# Patient Record
Sex: MALE | Race: WHITE | Employment: FULL TIME | URBAN - METROPOLITAN AREA
[De-identification: names, ages, dates, MRNs, and addresses within clinical notes are randomized per-mention and may not be internally consistent; named-entity substitution may affect disease eponyms.]

---

## 2017-03-28 ENCOUNTER — OFFICE VISIT (OUTPATIENT)
Dept: FAMILY MEDICINE CLINIC | Age: 32
End: 2017-03-28
Payer: COMMERCIAL

## 2017-03-28 VITALS
SYSTOLIC BLOOD PRESSURE: 122 MMHG | HEART RATE: 72 BPM | WEIGHT: 315 LBS | DIASTOLIC BLOOD PRESSURE: 80 MMHG | BODY MASS INDEX: 50.71 KG/M2

## 2017-03-28 DIAGNOSIS — R05.8 POST-VIRAL COUGH SYNDROME: Primary | ICD-10-CM

## 2017-03-28 PROCEDURE — 99213 OFFICE O/P EST LOW 20 MIN: CPT

## 2017-03-28 RX ORDER — PREDNISONE 50 MG/1
50 TABLET ORAL DAILY
Qty: 5 TABLET | Refills: 0 | Status: SHIPPED | OUTPATIENT
Start: 2017-03-28 | End: 2017-04-14

## 2017-03-28 RX ORDER — .ALPHA.-TOCOPHEROL ACETATE, DL-, CHOLECALCIFEROL, CYANOCOBALAMIN, FERROUS FUMARATE, FOLIC ACID, NIACINAMIDE, SODIUM ASCORBATE, ASCORBIC ACID, PYRIDOXINE HYDROCHLORIDE, RIBOFLAVIN, AND THIAMINE MONONITRATE 11; 400; 12; 29; 1; 20; 60; 60; 10; 3; 2 [IU]/1; [IU]/1; UG/1; MG/1; MG/1; MG/1; MG/1; MG/1; MG/1; MG/1; MG/1
TABLET, CHEWABLE ORAL
Refills: 5 | COMMUNITY
Start: 2017-03-11

## 2017-03-28 ASSESSMENT — ENCOUNTER SYMPTOMS
WHEEZING: 0
COUGH: 1
SHORTNESS OF BREATH: 0
SORE THROAT: 0
RHINORRHEA: 0

## 2017-04-14 RX ORDER — AMOXICILLIN 500 MG/1
500 CAPSULE ORAL 3 TIMES DAILY
Qty: 30 CAPSULE | Refills: 0 | Status: SHIPPED | OUTPATIENT
Start: 2017-04-14 | End: 2017-04-24

## 2018-08-10 ENCOUNTER — OFFICE VISIT (OUTPATIENT)
Dept: FAMILY MEDICINE CLINIC | Age: 33
End: 2018-08-10
Payer: COMMERCIAL

## 2018-08-10 VITALS
DIASTOLIC BLOOD PRESSURE: 70 MMHG | HEART RATE: 71 BPM | TEMPERATURE: 98.8 F | SYSTOLIC BLOOD PRESSURE: 122 MMHG | WEIGHT: 195 LBS | BODY MASS INDEX: 30.54 KG/M2

## 2018-08-10 DIAGNOSIS — J40 BRONCHITIS: Primary | ICD-10-CM

## 2018-08-10 DIAGNOSIS — M75.82 ROTATOR CUFF TENDONITIS, LEFT: ICD-10-CM

## 2018-08-10 PROCEDURE — 99213 OFFICE O/P EST LOW 20 MIN: CPT | Performed by: FAMILY MEDICINE

## 2018-08-10 RX ORDER — AMOXICILLIN 875 MG/1
875 TABLET, COATED ORAL 2 TIMES DAILY
Qty: 20 TABLET | Refills: 0 | Status: SHIPPED | OUTPATIENT
Start: 2018-08-10 | End: 2018-08-20

## 2018-08-10 ASSESSMENT — ENCOUNTER SYMPTOMS
SINUS PAIN: 1
TROUBLE SWALLOWING: 0
RHINORRHEA: 1
ABDOMINAL PAIN: 0
WHEEZING: 0
SINUS PRESSURE: 1
CONSTIPATION: 0
DIARRHEA: 0
SHORTNESS OF BREATH: 0
SORE THROAT: 1
COUGH: 1
BLOOD IN STOOL: 0
BACK PAIN: 0

## 2018-08-10 ASSESSMENT — PATIENT HEALTH QUESTIONNAIRE - PHQ9
2. FEELING DOWN, DEPRESSED OR HOPELESS: 0
SUM OF ALL RESPONSES TO PHQ QUESTIONS 1-9: 0
1. LITTLE INTEREST OR PLEASURE IN DOING THINGS: 0
SUM OF ALL RESPONSES TO PHQ9 QUESTIONS 1 & 2: 0
SUM OF ALL RESPONSES TO PHQ QUESTIONS 1-9: 0

## 2018-08-10 NOTE — PROGRESS NOTES
Byron Dela Cruz is a 29 y.o. male who presents today for his medical conditions/complaints as noted below. Byron Dela Cruz is c/o of Pharyngitis; Congestion; and Shoulder Pain (left)  Sick for four days all in family have had Strep throat. He also has had a four month hx of left shoulder pain and limited ROM. When he rolls onto his left side it awakens him in the middle of the night. HPI:     Visit Information    Have you changed or started any medications since your last visit including any over-the-counter medicines, vitamins, or herbal medicines? no   Are you having any side effects from any of your medications? -  no  Have you stopped taking any of your medications? Is so, why? -  no    Have you seen any other physician or provider since your last visit? No  Have you had any other diagnostic tests since your last visit? No  Have you been seen in the emergency room and/or had an admission to a hospital since we last saw you? No  Have you had your routine dental cleaning in the past 6 months? no    Have you activated your [employer] account? If not, what are your barriers? No:      Patient Care Team:  Elise Horowitz DO as PCP - Willard Ga MD (Dermatology)    Medical History Review  Past Medical, Family, and Social History reviewed and  contribute to the patient presenting condition    Health Maintenance   Topic Date Due    HIV screen  02/28/2001    Flu vaccine (1) 09/01/2018    DTaP/Tdap/Td vaccine (6 - Td) 11/07/2023       No past medical history on file. No past surgical history on file. No family history on file.   Social History   Substance Use Topics    Smoking status: Never Smoker    Smokeless tobacco: Never Used    Alcohol use Yes      Comment: rarely      Current Outpatient Prescriptions   Medication Sig Dispense Refill    amoxicillin (AMOXIL) 875 MG tablet Take 1 tablet by mouth 2 times daily for 10 days 20 tablet 0    PRENATA 29-1 MG CHEW CHEW AND SWALLOW 1

## 2018-08-21 ENCOUNTER — TELEPHONE (OUTPATIENT)
Dept: FAMILY MEDICINE CLINIC | Age: 33
End: 2018-08-21

## 2018-08-22 DIAGNOSIS — M25.512 LEFT SHOULDER PAIN, UNSPECIFIED CHRONICITY: Primary | ICD-10-CM

## 2019-06-06 ENCOUNTER — OFFICE VISIT (OUTPATIENT)
Dept: FAMILY MEDICINE CLINIC | Age: 34
End: 2019-06-06
Payer: COMMERCIAL

## 2019-06-06 VITALS
WEIGHT: 193.6 LBS | SYSTOLIC BLOOD PRESSURE: 120 MMHG | HEART RATE: 76 BPM | HEIGHT: 67 IN | OXYGEN SATURATION: 98 % | DIASTOLIC BLOOD PRESSURE: 70 MMHG | BODY MASS INDEX: 30.39 KG/M2

## 2019-06-06 DIAGNOSIS — Z98.84 HISTORY OF WEIGHT LOSS SURGERY: Primary | ICD-10-CM

## 2019-06-06 DIAGNOSIS — E66.09 OBESITY DUE TO EXCESS CALORIES WITHOUT SERIOUS COMORBIDITY, UNSPECIFIED CLASSIFICATION: ICD-10-CM

## 2019-06-06 PROCEDURE — 99213 OFFICE O/P EST LOW 20 MIN: CPT | Performed by: FAMILY MEDICINE

## 2019-06-06 RX ORDER — PHENTERMINE HYDROCHLORIDE 37.5 MG/1
37.5 CAPSULE ORAL EVERY MORNING
Qty: 30 CAPSULE | Refills: 0 | Status: SHIPPED | OUTPATIENT
Start: 2019-06-06 | End: 2019-07-06

## 2019-06-06 RX ORDER — IBUPROFEN 200 MG
400-600 TABLET ORAL
COMMUNITY

## 2019-06-06 ASSESSMENT — PATIENT HEALTH QUESTIONNAIRE - PHQ9
SUM OF ALL RESPONSES TO PHQ QUESTIONS 1-9: 0
1. LITTLE INTEREST OR PLEASURE IN DOING THINGS: 0
2. FEELING DOWN, DEPRESSED OR HOPELESS: 0
SUM OF ALL RESPONSES TO PHQ9 QUESTIONS 1 & 2: 0
SUM OF ALL RESPONSES TO PHQ QUESTIONS 1-9: 0

## 2019-06-06 ASSESSMENT — ENCOUNTER SYMPTOMS
DIARRHEA: 0
BACK PAIN: 0
WHEEZING: 0
CONSTIPATION: 0
BLOOD IN STOOL: 0
COUGH: 0
SHORTNESS OF BREATH: 0
ABDOMINAL PAIN: 0

## 2019-06-06 NOTE — PROGRESS NOTES
Jan Robledo is a 36 y.o. male who presents todayfor his medical conditions/complaints as noted below. Jan Robledo is here today c/oOther (weight management would like to try adipex)    Weight management issues. He underwent gastric sleeve couple years ago but now is struggling to get his wt under control, and is asking for phentermine therapy as this has helped in the past.  :     Visit Information    Have you changed or started any medications since your last visit including any over-the-counter medicines, vitamins, or herbal medicines? no   Are you having any side effects from any of your medications? -  no  Have you stopped taking any of your medications? Is so, why? -  no    Have you seen any other physician or provider since your last visit? Yes - Records Obtained  Have you had any other diagnostic tests since your last visit? No  Have you been seen in the emergency room and/or had an admission to a hospital since we last saw you? No  Have you had your routine dental cleaning in the past 6 months? no    Have you activated your [employer] account? If not, what are your barriers? No     Patient Care Team:  Jimbo Jacobson MD as PCP - General (Family Medicine)  Jerome Monet DO as [facility]CP [facility]  Magda Motta MD (Dermatology)    Medical History Review  Past Medical, Family, and Social History reviewed and does not contribute to the patient presenting condition    Health Maintenance   Topic Date Due    Varicella Vaccine (1 of 2 - 13+ 2-dose series) 02/28/1999    HIV screen  02/28/2001    Flu vaccine (Season Ended) 09/01/2019    DTaP/Tdap/Td vaccine (6 - Td) 11/07/2023    Pneumococcal 0-64 years Vaccine  Aged Out           HPI        No past medical history on file. No past surgical history on file. No family history on file.   Social History     Tobacco Use    Smoking status: Never Smoker    Smokeless tobacco: Never Used   Substance Use Topics    Alcohol use: Yes     Comment: rarely      Current Outpatient Medications   Medication Sig Dispense Refill    phentermine 37.5 MG capsule Take 1 capsule by mouth every morning for 30 days. 30 capsule 0    PRENATA 29-1 MG CHEW CHEW AND SWALLOW 1 TABLET BY MOUTH ONE TIME A DAY  5    ibuprofen (ADVIL;MOTRIN) 200 MG tablet Take 400-600 mg by mouth       No current facility-administered medications for this visit. Allergies   Allergen Reactions    Vicodin [Hydrocodone-Acetaminophen]          Subjective:   Review of Systems   Constitutional: Negative for chills, diaphoresis, fatigue and fever. HENT: Negative for congestion and hearing loss. Eyes: Negative for visual disturbance. Respiratory: Negative for cough, shortness of breath and wheezing. Cardiovascular: Negative for chest pain, palpitations and leg swelling. Gastrointestinal: Negative for abdominal pain, blood in stool, constipation and diarrhea. Genitourinary: Negative for dysuria. Musculoskeletal: Negative for arthralgias, back pain, gait problem and neck pain. Skin: Negative for rash. Neurological: Negative for weakness, numbness and headaches. Psychiatric/Behavioral: Negative for dysphoric mood and sleep disturbance.       :   /70   Pulse 76   Ht 5' 7\" (1.702 m)   Wt 193 lb 9.6 oz (87.8 kg)   SpO2 98%   BMI 30.32 kg/m²     Physical Exam   Constitutional: He is oriented to person, place, and time. He appears well-developed and well-nourished. No distress. HENT:   Head: Normocephalic and atraumatic. Mouth/Throat: No oropharyngeal exudate. Eyes: Right eye exhibits no discharge. Left eye exhibits no discharge. No scleral icterus. Neck: Neck supple. Carotid bruit is not present. No thyromegaly present. Cardiovascular: Normal rate, regular rhythm and normal heart sounds. Exam reveals no gallop and no friction rub. No murmur heard. Pulmonary/Chest: Breath sounds normal. No respiratory distress. He has no wheezes.  He has no

## 2020-06-15 ENCOUNTER — OFFICE VISIT (OUTPATIENT)
Dept: FAMILY MEDICINE CLINIC | Age: 35
End: 2020-06-15
Payer: COMMERCIAL

## 2020-06-15 VITALS
SYSTOLIC BLOOD PRESSURE: 118 MMHG | DIASTOLIC BLOOD PRESSURE: 82 MMHG | BODY MASS INDEX: 38.03 KG/M2 | HEART RATE: 58 BPM | WEIGHT: 242.8 LBS | OXYGEN SATURATION: 98 %

## 2020-06-15 PROCEDURE — 99213 OFFICE O/P EST LOW 20 MIN: CPT | Performed by: FAMILY MEDICINE

## 2020-06-15 RX ORDER — PREDNISONE 20 MG/1
TABLET ORAL
Qty: 20 TABLET | Refills: 0 | Status: SHIPPED | OUTPATIENT
Start: 2020-06-15

## 2020-06-15 ASSESSMENT — PATIENT HEALTH QUESTIONNAIRE - PHQ9
SUM OF ALL RESPONSES TO PHQ QUESTIONS 1-9: 0
1. LITTLE INTEREST OR PLEASURE IN DOING THINGS: 0
SUM OF ALL RESPONSES TO PHQ9 QUESTIONS 1 & 2: 0
2. FEELING DOWN, DEPRESSED OR HOPELESS: 0
SUM OF ALL RESPONSES TO PHQ QUESTIONS 1-9: 0

## 2020-06-15 ASSESSMENT — ENCOUNTER SYMPTOMS
SHORTNESS OF BREATH: 0
WHEEZING: 0
BLOOD IN STOOL: 0
COUGH: 0
BACK PAIN: 0
ABDOMINAL PAIN: 0
DIARRHEA: 0
CONSTIPATION: 0

## 2020-06-15 NOTE — PROGRESS NOTES
Exam  Constitutional:       General: He is not in acute distress. Appearance: He is well-developed. He is not diaphoretic. HENT:      Head: Normocephalic and atraumatic. Mouth/Throat:      Pharynx: No oropharyngeal exudate. Eyes:      General: No scleral icterus. Right eye: No discharge. Left eye: No discharge. Neck:      Musculoskeletal: Neck supple. Thyroid: No thyromegaly. Vascular: No carotid bruit. Cardiovascular:      Rate and Rhythm: Normal rate and regular rhythm. Heart sounds: Normal heart sounds. No murmur. No friction rub. No gallop. Pulmonary:      Effort: No respiratory distress. Breath sounds: Normal breath sounds. No wheezing or rales. Chest:      Chest wall: No tenderness. Abdominal:      Tenderness: There is no abdominal tenderness. Musculoskeletal:         General: No tenderness. Lymphadenopathy:      Cervical: No cervical adenopathy. Skin:     Findings: Erythema and rash (errythematous patches on torso and arms c/w acute eczematous reaction.) present. Neurological:      Mental Status: He is alert and oriented to person, place, and time. Cranial Nerves: No cranial nerve deficit. Coordination: Coordination normal.   Psychiatric:         Behavior: Behavior normal.         Thought Content: Thought content normal.         Judgment: Judgment normal.         Assessment:       Diagnosis Orders   1. Poison ivy dermatitis           Plan:      Return if symptoms worsen or fail to improve. No orders of the defined types were placed in this encounter. Orders Placed This Encounter   Medications    predniSONE (DELTASONE) 20 MG tablet     Sig: Take 3 tabs daily for 3 days, then 2 a day for 3 days, then 1 a day for 3 days, then 1/2 a day for 3 days. Dispense:  20 tablet     Refill:  0       Tx options discussed and above was agreed on and sent to his pharmacy to . He may follow up prn.

## 2021-09-17 ENCOUNTER — OFFICE VISIT (OUTPATIENT)
Dept: PRIMARY CARE CLINIC | Age: 36
End: 2021-09-17
Payer: COMMERCIAL

## 2021-09-17 VITALS
BODY MASS INDEX: 39.24 KG/M2 | HEIGHT: 67 IN | DIASTOLIC BLOOD PRESSURE: 83 MMHG | TEMPERATURE: 98.1 F | HEART RATE: 80 BPM | SYSTOLIC BLOOD PRESSURE: 119 MMHG | OXYGEN SATURATION: 97 % | WEIGHT: 250 LBS

## 2021-09-17 DIAGNOSIS — H60.392 ACUTE INFECTIVE OTITIS EXTERNA, LEFT: ICD-10-CM

## 2021-09-17 DIAGNOSIS — J02.0 ACUTE STREPTOCOCCAL PHARYNGITIS: Primary | ICD-10-CM

## 2021-09-17 DIAGNOSIS — J02.9 SORE THROAT: ICD-10-CM

## 2021-09-17 LAB — S PYO AG THROAT QL: POSITIVE

## 2021-09-17 PROCEDURE — 99214 OFFICE O/P EST MOD 30 MIN: CPT

## 2021-09-17 PROCEDURE — 87880 STREP A ASSAY W/OPTIC: CPT

## 2021-09-17 RX ORDER — CIPROFLOXACIN AND DEXAMETHASONE 3; 1 MG/ML; MG/ML
4 SUSPENSION/ DROPS AURICULAR (OTIC) 2 TIMES DAILY
Qty: 1 EACH | Refills: 0 | Status: SHIPPED | OUTPATIENT
Start: 2021-09-17 | End: 2021-09-24

## 2021-09-17 RX ORDER — AMOXICILLIN 500 MG/1
500 CAPSULE ORAL 2 TIMES DAILY
Qty: 20 CAPSULE | Refills: 0 | Status: SHIPPED | OUTPATIENT
Start: 2021-09-17 | End: 2021-09-27

## 2021-09-17 ASSESSMENT — PATIENT HEALTH QUESTIONNAIRE - PHQ9
SUM OF ALL RESPONSES TO PHQ QUESTIONS 1-9: 0
SUM OF ALL RESPONSES TO PHQ QUESTIONS 1-9: 0
SUM OF ALL RESPONSES TO PHQ9 QUESTIONS 1 & 2: 0
SUM OF ALL RESPONSES TO PHQ QUESTIONS 1-9: 0
1. LITTLE INTEREST OR PLEASURE IN DOING THINGS: 0
2. FEELING DOWN, DEPRESSED OR HOPELESS: 0

## 2021-09-17 NOTE — PROGRESS NOTES
[facility] [address] [place] [facility]  [address] [facility] [id number]    [facility]YSIC[facility]  [address] [facility]  [address] [place] [address]  [address] [address]  Dept: [phone]    Emiliano Bowling is a 29 y.o. male Established patient, who presents to the walk-in clinic today with conditions/complaints as noted below:    Chief Complaint   Patient presents with    Otalgia     lt - started last night and worsened and has a sinus ha         HPI:     Sinusitis  This is a new problem. The current episode started yesterday. The problem has been gradually worsening since onset. There has been no fever. The pain is mild. Associated symptoms include ear pain, sinus pressure and a sore throat. Pertinent negatives include no chills, congestion, coughing, headaches, shortness of breath or sneezing. Past treatments include nothing. Child tested positive for strep  History reviewed. No pertinent past medical history. Current Outpatient Medications   Medication Sig Dispense Refill    ciprofloxacin-dexamethasone (CIPRODEX) 0.3-0.1 % otic suspension Place 4 drops into the left ear 2 times daily for 7 days 1 each 0    amoxicillin (AMOXIL) 500 MG capsule Take 1 capsule by mouth 2 times daily for 10 days 20 capsule 0    ibuprofen (ADVIL;MOTRIN) 200 MG tablet Take 400-600 mg by mouth      predniSONE (DELTASONE) 20 MG tablet Take 3 tabs daily for 3 days, then 2 a day for 3 days, then 1 a day for 3 days, then 1/2 a day for 3 days. (Patient not taking: Reported on 9/17/2021) 20 tablet 0    PRENATA 29-1 MG CHEW CHEW AND SWALLOW 1 TABLET BY MOUTH ONE TIME A DAY (Patient not taking: Reported on 9/17/2021)  5     No current facility-administered medications for this visit. Allergies   Allergen Reactions    Vicodin [Hydrocodone-Acetaminophen]        Review of Systems:     Review of Systems   Constitutional: Negative. Negative for chills, fatigue and fever.    HENT: Positive for ear pain, sinus pressure and sore throat. Negative for congestion, postnasal drip, rhinorrhea, sinus pain and sneezing. Eyes: Negative. Negative for pain, discharge and itching. Respiratory: Negative. Negative for cough, chest tightness, shortness of breath and wheezing. Cardiovascular: Negative. Negative for chest pain, palpitations and leg swelling. Gastrointestinal: Negative. Negative for abdominal pain, diarrhea, nausea and vomiting. Skin: Negative. Negative for rash. Neurological: Negative. Negative for dizziness, weakness, numbness and headaches. Physical Exam:      /83 (Site: Left Upper Arm, Position: Sitting, Cuff Size: Large Adult)   Pulse 80   Temp 98.1 °F (36.7 °C) (Temporal)   Ht 5' 7\" (1.702 m)   Wt 250 lb (113.4 kg)   SpO2 97%   BMI 39.16 kg/m²     Physical Exam  Vitals reviewed. Constitutional:       Appearance: Normal appearance. He is normal weight. HENT:      Head: Normocephalic. Right Ear: Tympanic membrane, ear canal and external ear normal.      Left Ear: Tympanic membrane normal. Swelling and tenderness present. There is mastoid tenderness. Nose: Nose normal.      Mouth/Throat:      Mouth: Mucous membranes are moist.      Pharynx: Oropharynx is clear. Uvula midline. Posterior oropharyngeal erythema present. No pharyngeal swelling, oropharyngeal exudate or uvula swelling. Eyes:      Conjunctiva/sclera: Conjunctivae normal.      Pupils: Pupils are equal, round, and reactive to light. Cardiovascular:      Rate and Rhythm: Normal rate and regular rhythm. Heart sounds: Normal heart sounds. Pulmonary:      Effort: Pulmonary effort is normal.      Breath sounds: Normal breath sounds. Abdominal:      General: Abdomen is flat. Bowel sounds are normal.      Palpations: Abdomen is soft. Musculoskeletal:      Cervical back: Normal range of motion and neck supple. Skin:     General: Skin is warm and dry.       Capillary Refill: Capillary refill takes less than 2 seconds. Neurological:      General: No focal deficit present. Mental Status: He is alert and oriented to person, place, and time. Mental status is at baseline. Plan:          1. Acute streptococcal pharyngitis  -     amoxicillin (AMOXIL) 500 MG capsule; Take 1 capsule by mouth 2 times daily for 10 days, Disp-20 capsule, R-0Normal  2. Sore throat  -     POCT rapid strep A  3. Acute infective otitis externa, left  -     ciprofloxacin-dexamethasone (CIPRODEX) 0.3-0.1 % otic suspension; Place 4 drops into the left ear 2 times daily for 7 days, Disp-1 each, R-0Normal    Results for POC orders placed in visit on 09/17/21   POCT rapid strep A   Result Value Ref Range    Strep A Ag Positive (A) None Detected         Follow Up Instructions:      Return if symptoms worsen or fail to improve. Orders Placed This Encounter   Medications    ciprofloxacin-dexamethasone (CIPRODEX) 0.3-0.1 % otic suspension     Sig: Place 4 drops into the left ear 2 times daily for 7 days     Dispense:  1 each     Refill:  0    amoxicillin (AMOXIL) 500 MG capsule     Sig: Take 1 capsule by mouth 2 times daily for 10 days     Dispense:  20 capsule     Refill:  0           Patient instructed to complete entire antibiotic course. Tylenol/Motrin as needed for fever/discomfort. Change toothbrush in 24 hours. Salt water gargles and throat lozenges if desired. Patient agreeable to treatment plan. Educational materials provided on AVS.  Follow up if symptoms do not improve/worsen. Patient and/or parent given educational materials - see patient instructions. Discussed use, benefit, and side effects of prescribed medications. All patient questions answered. Patient and/or parent voiced understanding.       Electronically signed by TRUONG Patten 9/19/2021 at 9:42 PM

## 2021-09-17 NOTE — PATIENT INSTRUCTIONS

## 2021-09-19 ASSESSMENT — ENCOUNTER SYMPTOMS
DIARRHEA: 0
EYE DISCHARGE: 0
EYE PAIN: 0
SHORTNESS OF BREATH: 0
ABDOMINAL PAIN: 0
RESPIRATORY NEGATIVE: 1
WHEEZING: 0
RHINORRHEA: 0
EYE ITCHING: 0
SINUS PAIN: 0
NAUSEA: 0
EYES NEGATIVE: 1
GASTROINTESTINAL NEGATIVE: 1
VOMITING: 0
SINUS PRESSURE: 1
CHEST TIGHTNESS: 0
COUGH: 0
SORE THROAT: 1

## 2021-10-24 ENCOUNTER — NURSE TRIAGE (OUTPATIENT)
Dept: OTHER | Age: 36
End: 2021-10-24

## 2021-10-24 NOTE — TELEPHONE ENCOUNTER
Reason for Disposition   Urinating more frequently than usual (i.e., frequency)    Answer Assessment - Initial Assessment Questions  1. SYMPTOM: \"What's the main symptom you're concerned about? \" (e.g., frequency, incontinence)      Increase frequescy  2. ONSET: \"When did the    start? \"     Yesterday   3. PAIN: \"Is there any pain? \" If so, ask: \"How bad is it? \" (Scale: 1-10; mild, moderate, severe)      2-3 currently. Yesterday 8-9   4. CAUSE: \"What do you think is causing the symptoms? \"     unsure  5. OTHER SYMPTOMS: \"Do you have any other symptoms? \" (e.g., fever, flank pain, blood in urine, pain with urination)      Rt side abd pain   6. PREGNANCY: \"Is there any chance you are pregnant? \" \"When was your last menstrual period? \"    Protocols used: [facility]

## 2021-10-25 NOTE — TELEPHONE ENCOUNTER
Pt is currently out of town. Two days of Dark color urine; darker than apple cider. Increase in frequency and right side abdominal pain. Yesterday discomfort rated 8-9, current discomfort 2-3. Denies radiation of pain to other areas. Denies fever,urgency, and burning   Urinary symptoms protocol and plan of  Care discussed. Recommend pt be seen at an urgent care in [facility] he is currently at within 24 hours for treatment. Call office when he returns for appointment with current provider. Pt agrees.   ramez/rn

## 2021-11-17 ENCOUNTER — OFFICE VISIT (OUTPATIENT)
Dept: PRIMARY CARE CLINIC | Age: 36
End: 2021-11-17
Payer: COMMERCIAL

## 2021-11-17 VITALS
HEIGHT: 67 IN | SYSTOLIC BLOOD PRESSURE: 105 MMHG | DIASTOLIC BLOOD PRESSURE: 74 MMHG | HEART RATE: 82 BPM | TEMPERATURE: 98.4 F | OXYGEN SATURATION: 98 % | BODY MASS INDEX: 39.24 KG/M2 | WEIGHT: 250 LBS

## 2021-11-17 DIAGNOSIS — Z20.818 EXPOSURE TO STREP THROAT: Primary | ICD-10-CM

## 2021-11-17 DIAGNOSIS — J02.9 SORE THROAT: ICD-10-CM

## 2021-11-17 LAB — S PYO AG THROAT QL: NORMAL

## 2021-11-17 PROCEDURE — 87880 STREP A ASSAY W/OPTIC: CPT | Performed by: FAMILY MEDICINE

## 2021-11-17 PROCEDURE — 99214 OFFICE O/P EST MOD 30 MIN: CPT | Performed by: FAMILY MEDICINE

## 2021-11-17 RX ORDER — PENICILLIN V POTASSIUM 500 MG/1
500 TABLET ORAL 2 TIMES DAILY
Qty: 20 TABLET | Refills: 0 | Status: SHIPPED | OUTPATIENT
Start: 2021-11-17 | End: 2021-11-27

## 2021-11-17 ASSESSMENT — ENCOUNTER SYMPTOMS
WHEEZING: 0
SORE THROAT: 1
SWOLLEN GLANDS: 0
VOMITING: 0
SHORTNESS OF BREATH: 0
CHANGE IN BOWEL HABIT: 0
RHINORRHEA: 1
DIARRHEA: 0
NAUSEA: 0
COUGH: 0
ABDOMINAL PAIN: 0

## 2021-11-17 NOTE — PROGRESS NOTES
[facility] [address] [facility] [place] [facility]  [address] [address]  Dept: [phone]  Dept Fax: [phone]    Carol Galvez is a 29 y.o. male who presents today for his medical conditions/complaintsas noted below. Carol Galvez is c/o of Pharyngitis (pt has been having sore throat and has been having headache with pos exposure to strep X 2 days )        HPI:     Pharyngitis  This is a new problem. The current episode started in the past 7 days (2 days). The problem occurs constantly. The problem has been unchanged. Associated symptoms include congestion, headaches and a sore throat. Pertinent negatives include no abdominal pain, change in bowel habit, chills, coughing, fever, myalgias, nausea, rash, swollen glands or vomiting. Nothing aggravates the symptoms. Treatments tried: excedrin, salt water gargles. The treatment provided no relief. Exposure to strep-both kids have strep  No significant past medical history  Denies concern for COVID  History reviewed. No pertinent past medical history. Past Surgical History:   Procedure Laterality Date    BICEPS TENDON REPAIR      KNEE SURGERY Right     ROTATOR CUFF REPAIR Left    Past medical history reviewed and pertinent positives/negatives in the HPI      History reviewed. No pertinent family history. Social History     Tobacco Use    Smoking status: Never Smoker    Smokeless tobacco: Never Used   Substance Use Topics    Alcohol use: Yes     Comment: rarely      Current Outpatient Medications   Medication Sig Dispense Refill    penicillin v potassium (VEETID) 500 MG tablet Take 1 tablet by mouth 2 times daily for 10 days 20 tablet 0    predniSONE (DELTASONE) 20 MG tablet Take 3 tabs daily for 3 days, then 2 a day for 3 days, then 1 a day for 3 days, then 1/2 a day for 3 days.  (Patient not taking: Reported on 9/17/2021) 20 tablet 0    ibuprofen (ADVIL;MOTRIN) 200 MG tablet Take 400-600 mg by mouth      PRENATA 29-1 MG CHEW CHEW AND SWALLOW 1 TABLET BY MOUTH ONE TIME A DAY (Patient not taking: Reported on 9/17/2021)  5     No current facility-administered medications for this visit. Allergies   Allergen Reactions    Vicodin [Hydrocodone-Acetaminophen]        Health Maintenance   Topic Date Due    Hepatitis C screen  Never done    Varicella vaccine (1 of 2 - 2-dose childhood series) Never done    COVID-19 Vaccine (1) Never done    HIV screen  Never done    Flu vaccine (1) 09/01/2021    DTaP/Tdap/Td vaccine (6 - Td or Tdap) 11/07/2023    Hepatitis B vaccine  Completed    Hepatitis A vaccine  Aged Out    Hib vaccine  Aged Out    Meningococcal (ACWY) vaccine  Aged Out    Pneumococcal 0-64 years Vaccine  Aged Out       :      Review of Systems   Constitutional: Negative for chills and fever. HENT: Positive for congestion, rhinorrhea and sore throat. Negative for ear pain. Respiratory: Negative for cough, shortness of breath and wheezing. Gastrointestinal: Negative for abdominal pain, change in bowel habit, diarrhea, nausea and vomiting. Musculoskeletal: Negative for myalgias. Skin: Negative for pallor and rash. Neurological: Positive for headaches. Hematological: Negative for adenopathy. Objective:     Physical Exam  Vitals and nursing note reviewed. Constitutional:       Appearance: Normal appearance. He is obese. HENT:      Head: Normocephalic and atraumatic. Right Ear: Hearing normal.      Left Ear: Hearing normal.      Nose: Nose normal.      Mouth/Throat:      Lips: Pink. Mouth: Mucous membranes are moist.      Pharynx: Oropharynx is clear. Posterior oropharyngeal erythema present. Tonsils: No tonsillar exudate. Eyes:      Extraocular Movements: Extraocular movements intact. Conjunctiva/sclera: Conjunctivae normal.   Cardiovascular:      Rate and Rhythm: Normal rate and regular rhythm. Heart sounds: Normal heart sounds.    Pulmonary:      Effort: Pulmonary effort is normal.      Breath sounds: Normal breath sounds. Musculoskeletal:      Cervical back: Normal range of motion. No tenderness. No muscular tenderness. Lymphadenopathy:      Cervical: No cervical adenopathy. Skin:     General: Skin is warm and dry. Neurological:      Mental Status: He is alert and oriented to person, place, and time. Mental status is at baseline. Psychiatric:         Mood and Affect: Mood normal.         Behavior: Behavior normal.         Thought Content: Thought content normal.         Judgment: Judgment normal.       /74 (Site: Left Upper Arm, Position: Sitting, Cuff Size: Large Adult)   Pulse 82   Temp 98.4 °F (36.9 °C) (Tympanic)   Ht 5' 7\" (1.702 m)   Wt 250 lb (113.4 kg)   SpO2 98%   BMI 39.16 kg/m²     Assessment:       Diagnosis Orders   1. Exposure to strep throat     2. Sore throat  POCT rapid strep A       Plan:    Strep test in office negative. Continue over-the-counter cough/cold medications as needed for symptoms. Due to close exposure to strep take penicillin as prescribed. If symptoms worsen or do not improve please follow-up with PCP or return to clinic    Orders Placed This Encounter   Procedures    POCT rapid strep A     Orders Placed This Encounter   Medications    penicillin v potassium (VEETID) 500 MG tablet     Sig: Take 1 tablet by mouth 2 times daily for 10 days     Dispense:  20 tablet     Refill:  0      Patient given educational materials - see patient instructions. Discussed use, benefit, and side effects of prescribed medications. All patient questions answered. Pt voiced understanding. Follow up as directed.      Electronicallysigned by Luis Gómez MD on 11/17/2021 at 9:20 AM

## 2021-12-27 ENCOUNTER — NURSE TRIAGE (OUTPATIENT)
Dept: OTHER | Facility: CLINIC | Age: 36
End: 2021-12-27

## 2021-12-27 NOTE — TELEPHONE ENCOUNTER
Received call from Cristo at [facility], caller not on line. Complaint: Patient complaining of diarrhea x5 days    Market: [place] Ch[place]illa Name: [address] telephone number verified as [phone]    Connected with caller via phone, please see below triage     Received call from Cristo at [facility] with Red Flag Complaint. Unestablished patinent. Does not currently know who he wants to establish care with. Subjective: Caller states \"Anytime I eat or drink it comes right out, having a problem keeping anything in stomach \"     Current Symptoms: 30 stools in 24 hours, Little urine output, more yellow in color. No cramping, bloating, pain, or blood, just frequent stools. Tested positive for COVID today, wants to get antibody infusions. Onset: 7 days ago; gradual    Associated Symptoms: reduced activity,     Pain Severity: 7/10; piercing; constant in chest from cough, no pain in abdomen from diarrhea     Temperature: Not Currently, but will spike if not taking medication. 102.6 by forehead thermometer    What has been tried: DayQuil, NyQuil, Vitamin E, C, Zinc. Immodium  - no relief    LMP: NA Pregnant: NA    Recommended disposition: SEE Provider within 24 hours. Since he is unestablished and covid positive, discussed virtual visits, but if unable to be seen via virtual, advised seek care in UC. Care advice provided, patient verbalizes understanding; denies any other questions or concerns; instructed to call back for any new or worsening symptoms. Patient contacting insurance company for contracted Virtual Visit companies     Attention Provider: Thank you for allowing me to participate in the care of your patient. The patient was connected to triage in response to information provided to the ECC/PSC. Please do not respond through this encounter as the response is not directed to a shared pool.       Reason for Disposition   [1] SEVERE diarrhea (e.g., 7 or more times / day more than normal) AND [2] present > 24 hours (1 day)    Protocols used: [facility]

## 2022-06-20 ENCOUNTER — OFFICE VISIT (OUTPATIENT)
Dept: PRIMARY CARE CLINIC | Age: 37
End: 2022-06-20
Payer: COMMERCIAL

## 2022-06-20 VITALS
OXYGEN SATURATION: 95 % | SYSTOLIC BLOOD PRESSURE: 117 MMHG | DIASTOLIC BLOOD PRESSURE: 81 MMHG | HEART RATE: 84 BPM | TEMPERATURE: 98.2 F

## 2022-06-20 DIAGNOSIS — J02.9 ACUTE PHARYNGITIS, UNSPECIFIED ETIOLOGY: Primary | ICD-10-CM

## 2022-06-20 PROCEDURE — 99213 OFFICE O/P EST LOW 20 MIN: CPT | Performed by: NURSE PRACTITIONER

## 2022-06-20 RX ORDER — AMOXICILLIN 500 MG/1
500 CAPSULE ORAL 2 TIMES DAILY
Qty: 20 CAPSULE | Refills: 0 | Status: SHIPPED | OUTPATIENT
Start: 2022-06-20 | End: 2022-06-30

## 2022-06-20 ASSESSMENT — ENCOUNTER SYMPTOMS
EYE REDNESS: 0
SINUS PRESSURE: 0
SORE THROAT: 1
WHEEZING: 0
CHEST TIGHTNESS: 0
VOICE CHANGE: 0
COUGH: 1
EYE DISCHARGE: 0
SHORTNESS OF BREATH: 0

## 2022-06-20 ASSESSMENT — PATIENT HEALTH QUESTIONNAIRE - PHQ9
SUM OF ALL RESPONSES TO PHQ QUESTIONS 1-9: 0
2. FEELING DOWN, DEPRESSED OR HOPELESS: 0
SUM OF ALL RESPONSES TO PHQ9 QUESTIONS 1 & 2: 0
SUM OF ALL RESPONSES TO PHQ QUESTIONS 1-9: 0
1. LITTLE INTEREST OR PLEASURE IN DOING THINGS: 0

## 2022-06-20 NOTE — PATIENT INSTRUCTIONS
Patient Education        Strep Throat: Care Instructions  Your Care Instructions     Strep throat is a bacterial infection that causes sudden, severe sore throat and fever. Strep throat, which is caused by bacteria called streptococcus, is treated with antibiotics. Sometimes a strep test is necessary to tell if the sore throat is caused by strep bacteria. Treatment can help ease symptoms andmay prevent future problems. Follow-up care is a key part of your treatment and safety. Be sure to make and go to all appointments, and call your doctor if you are having problems. It's also a good idea to know your test results and keep alist of the medicines you take. How can you care for yourself at home?  Take your antibiotics as directed. Do not stop taking them just because you feel better. You need to take the full course of antibiotics.  Strep throat can spread to others until 24 hours after you begin taking antibiotics. During this time, avoid contact with other people at work, school, or home, especially infants and children. Do not sneeze or cough on others, and wash your hands often. Keep your drinking glass and eating utensils separate from those of others. Wash these items well in hot, soapy water.  Gargle with warm salt water at least once each hour to help reduce swelling and make your throat feel better. Use 1 teaspoon of salt mixed in 8 fluid ounces of warm water.  Take an over-the-counter pain medication, such as acetaminophen (Tylenol), ibuprofen (Advil, Motrin), or naproxen (Aleve). Read and follow all instructions on the label.  Try an over-the-counter anesthetic throat spray or throat lozenges, which may help relieve throat pain.  Drink plenty of fluids. Fluids may help soothe an irritated throat. Hot fluids, such as tea or soup, may help your throat feel better.  Eat soft solids and drink plenty of clear liquids.  Flavored ice pops, ice cream, scrambled eggs, sherbet, and gelatin dessert (such as Jell-O) may also soothe the throat.  Get lots of rest.   Do not smoke, and avoid secondhand smoke. If you need help quitting, talk to your doctor about stop-smoking programs and medicines. These can increase your chances of quitting for good.  Use a vaporizer or humidifier to add moisture to the air in your bedroom. Follow the directions for cleaning the machine. When should you call for help? Call your doctor now or seek immediate medical care if:     You have a new or higher fever.      You have a fever with a stiff neck or severe headache.      You have new or worse trouble swallowing.      Your sore throat gets much worse on one side.      Your pain becomes much worse on one side of your throat. Watch closely for changes in your health, and be sure to contact your doctor if:     You are not getting better after 2 days (48 hours).      You do not get better as expected. Where can you learn more? Go to https://[employer] org and sign in to your [employer] account. Enter K625 in the [facility] box to learn more about \"Strep Throat: Care Instructions. \"     If you do not have an account, please click on the \"Sign Up Now\" link. Current as of: September 8, 2021               Content Version: 13.2  © 2006-2022 [employer]. Care instructions adapted under license by [facility] ([facility]– [place]). If you have questions about a medical condition or this instruction, always ask your healthcare professional. [place] [address] any warranty or liability for your use of this information.